# Patient Record
Sex: FEMALE | Race: WHITE | NOT HISPANIC OR LATINO | Employment: FULL TIME | URBAN - METROPOLITAN AREA
[De-identification: names, ages, dates, MRNs, and addresses within clinical notes are randomized per-mention and may not be internally consistent; named-entity substitution may affect disease eponyms.]

---

## 2024-01-17 ENCOUNTER — OFFICE VISIT (OUTPATIENT)
Dept: PRIMARY CARE | Facility: CLINIC | Age: 34
End: 2024-01-17
Payer: COMMERCIAL

## 2024-01-17 VITALS
HEART RATE: 99 BPM | DIASTOLIC BLOOD PRESSURE: 82 MMHG | BODY MASS INDEX: 24.64 KG/M2 | OXYGEN SATURATION: 100 % | WEIGHT: 157 LBS | HEIGHT: 67 IN | TEMPERATURE: 98 F | SYSTOLIC BLOOD PRESSURE: 120 MMHG

## 2024-01-17 DIAGNOSIS — M79.605 LEG PAIN, ANTERIOR, LEFT: Primary | ICD-10-CM

## 2024-01-17 DIAGNOSIS — M76.32 IT BAND SYNDROME, LEFT: ICD-10-CM

## 2024-01-17 PROCEDURE — 99213 OFFICE O/P EST LOW 20 MIN: CPT | Performed by: FAMILY MEDICINE

## 2024-01-17 PROCEDURE — 1036F TOBACCO NON-USER: CPT | Performed by: FAMILY MEDICINE

## 2024-01-17 RX ORDER — ATOMOXETINE 40 MG/1
1 CAPSULE ORAL DAILY
COMMUNITY
Start: 2022-09-06 | End: 2024-01-17 | Stop reason: WASHOUT

## 2024-01-17 RX ORDER — VALACYCLOVIR HYDROCHLORIDE 1 G/1
TABLET, FILM COATED ORAL
COMMUNITY
Start: 2023-02-17

## 2024-01-17 RX ORDER — METHOCARBAMOL 500 MG/1
500 TABLET, FILM COATED ORAL 4 TIMES DAILY PRN
Qty: 90 TABLET | Refills: 0 | Status: SHIPPED | OUTPATIENT
Start: 2024-01-17 | End: 2024-03-11

## 2024-01-17 RX ORDER — NAPROXEN 500 MG/1
500 TABLET ORAL 2 TIMES DAILY PRN
Qty: 60 TABLET | Refills: 0 | Status: SHIPPED | OUTPATIENT
Start: 2024-01-17 | End: 2024-03-18

## 2024-01-17 RX ORDER — LACTIC ACID, L-, CITRIC ACID MONOHYDRATE, AND POTASSIUM BITARTRATE 90; 50; 20 MG/5G; MG/5G; MG/5G
GEL VAGINAL
COMMUNITY
Start: 2023-08-21 | End: 2024-01-17 | Stop reason: WASHOUT

## 2024-01-17 RX ORDER — SUCRALFATE 1 G/1
1 TABLET ORAL 4 TIMES DAILY
COMMUNITY
Start: 2023-03-01

## 2024-01-17 RX ORDER — TRANEXAMIC ACID 650 MG/1
TABLET ORAL
COMMUNITY
Start: 2023-10-17

## 2024-01-17 ASSESSMENT — PATIENT HEALTH QUESTIONNAIRE - PHQ9
2. FEELING DOWN, DEPRESSED OR HOPELESS: NOT AT ALL
SUM OF ALL RESPONSES TO PHQ9 QUESTIONS 1 AND 2: 0
1. LITTLE INTEREST OR PLEASURE IN DOING THINGS: NOT AT ALL

## 2024-01-17 NOTE — PROGRESS NOTES
Patient is here with some left leg pain radiating down into the inside of the left knee.  Nothing that she can really think about.  A little bit worse with sitting to standing.  No bruising or deformity.  She has taken some Tylenol with mild help    REVIEW OF SYSTEMS: 12 systems negative except for those mentioned in the HPI.    PHYSICAL EXAMINATION:   Constitutional: The patient is alert, in no acute  distress.  Eyes: Extraocular movements are intact.   ENT: external ear canals patent  Neck: no  JVD.  Heart: no JVD  Lungs: Normal respiration without stridor or nasal flaring   Psychiatric: Good judgment and insight. Normal affect and mood.  Skin: no rashes or lesions  MSK/neurologic: Cranials 2-12 grossly intact.  5/5 motor lower extremities.  There is pain at the insertion of the medial superior MCL on the left leg going up in the vastus medialis.  There is some spasm midway up the left thigh.  There is also moderate pain to the greater trochanter and bursa on the left with negative BRUNA sign going down the IT band.  No other gross deformity.  Negative psoas sign.      Assessment:  per EMR    Plan:  Discussed physical therapy, naproxen, strengthening with squats and adduction and abduction exercises of the legs.  Also discussed muscle relaxer warm moist heat follow-up if not improving or if physical therapy.  I do believe this is muscular strain    This dictation was created using Dragon dictation and may contain errors

## 2024-01-17 NOTE — PROGRESS NOTES
Leg pain, left - x 1 month, walking makes it worse, starts at groin area and goes down leg medially

## 2024-01-31 DIAGNOSIS — R22.2 SUPRACLAVICULAR FOSSA FULLNESS: Primary | ICD-10-CM

## 2024-08-28 PROBLEM — R00.2 PALPITATIONS: Status: ACTIVE | Noted: 2024-08-28

## 2024-08-28 PROBLEM — D50.9 IRON DEFICIENCY ANEMIA: Status: ACTIVE | Noted: 2024-08-28

## 2024-08-28 PROBLEM — E55.9 VITAMIN D DEFICIENCY: Status: ACTIVE | Noted: 2024-08-28

## 2024-08-28 PROBLEM — F41.9 ANXIETY: Status: ACTIVE | Noted: 2024-08-28

## 2024-08-28 PROBLEM — R51.9 HEADACHE: Status: ACTIVE | Noted: 2024-08-28

## 2024-08-28 PROBLEM — L30.9 ECZEMA: Status: ACTIVE | Noted: 2024-08-28

## 2024-08-28 PROBLEM — R06.02 SHORTNESS OF BREATH: Status: ACTIVE | Noted: 2024-08-28

## 2024-08-28 PROBLEM — A60.00 GENITAL HERPES: Status: ACTIVE | Noted: 2024-08-28

## 2024-08-28 PROBLEM — R41.840 ATTENTION DEFICIT: Status: ACTIVE | Noted: 2024-08-28

## 2024-08-28 PROBLEM — K21.9 GERD (GASTROESOPHAGEAL REFLUX DISEASE): Status: ACTIVE | Noted: 2024-08-28

## 2024-08-28 PROBLEM — B00.9 HERPES SIMPLEX VIRUS (HSV) INFECTION: Status: ACTIVE | Noted: 2024-08-28

## 2024-08-28 PROBLEM — R21 RASH: Status: ACTIVE | Noted: 2024-08-28

## 2024-08-28 PROBLEM — R12 PYROSIS: Status: ACTIVE | Noted: 2024-08-28

## 2024-08-28 PROBLEM — R87.619 ABNORMAL CERVICAL PAPANICOLAOU SMEAR: Status: ACTIVE | Noted: 2024-08-28

## 2024-08-28 PROBLEM — J06.9 URI, ACUTE: Status: ACTIVE | Noted: 2024-08-28

## 2024-08-28 PROBLEM — R07.89 ATYPICAL CHEST PAIN: Status: ACTIVE | Noted: 2024-08-28

## 2024-08-28 PROBLEM — U07.1 DISEASE DUE TO SEVERE ACUTE RESPIRATORY SYNDROME CORONAVIRUS 2 (SARS-COV-2): Status: ACTIVE | Noted: 2024-08-28

## 2024-08-28 PROBLEM — F39 MOOD DISORDER (CMS-HCC): Status: ACTIVE | Noted: 2024-08-28

## 2024-08-28 PROBLEM — F32.A DEPRESSION: Status: ACTIVE | Noted: 2024-08-28

## 2024-08-28 PROBLEM — R19.7 DIARRHEA: Status: ACTIVE | Noted: 2024-08-28

## 2024-08-28 PROBLEM — F41.1 GENERALIZED ANXIETY DISORDER: Status: ACTIVE | Noted: 2024-08-28

## 2024-08-28 PROBLEM — H93.19 TINNITUS: Status: ACTIVE | Noted: 2024-08-28

## 2024-08-28 PROBLEM — R11.2 NAUSEA AND/OR VOMITING: Status: ACTIVE | Noted: 2024-08-28

## 2024-08-28 PROBLEM — R10.9 ABDOMINAL PAIN: Status: ACTIVE | Noted: 2024-08-28

## 2024-08-29 ENCOUNTER — APPOINTMENT (OUTPATIENT)
Dept: PRIMARY CARE | Facility: CLINIC | Age: 34
End: 2024-08-29
Payer: COMMERCIAL

## 2024-08-29 VITALS
HEIGHT: 67 IN | DIASTOLIC BLOOD PRESSURE: 77 MMHG | BODY MASS INDEX: 24.8 KG/M2 | HEART RATE: 78 BPM | SYSTOLIC BLOOD PRESSURE: 114 MMHG | TEMPERATURE: 98 F | WEIGHT: 158 LBS

## 2024-08-29 DIAGNOSIS — F41.9 ANXIETY: Primary | ICD-10-CM

## 2024-08-29 DIAGNOSIS — E55.9 VITAMIN D DEFICIENCY: ICD-10-CM

## 2024-08-29 DIAGNOSIS — M79.605 LEG PAIN, ANTERIOR, LEFT: ICD-10-CM

## 2024-08-29 DIAGNOSIS — R41.840 ATTENTION DEFICIT: ICD-10-CM

## 2024-08-29 DIAGNOSIS — S46.011A STRAIN OF RIGHT ROTATOR CUFF CAPSULE, INITIAL ENCOUNTER: ICD-10-CM

## 2024-08-29 DIAGNOSIS — F41.1 GENERALIZED ANXIETY DISORDER: ICD-10-CM

## 2024-08-29 DIAGNOSIS — M76.32 IT BAND SYNDROME, LEFT: ICD-10-CM

## 2024-08-29 PROCEDURE — 1036F TOBACCO NON-USER: CPT | Performed by: FAMILY MEDICINE

## 2024-08-29 PROCEDURE — 97110 THERAPEUTIC EXERCISES: CPT | Performed by: FAMILY MEDICINE

## 2024-08-29 PROCEDURE — 3008F BODY MASS INDEX DOCD: CPT | Performed by: FAMILY MEDICINE

## 2024-08-29 PROCEDURE — 99214 OFFICE O/P EST MOD 30 MIN: CPT | Performed by: FAMILY MEDICINE

## 2024-08-29 RX ORDER — NAPROXEN 500 MG/1
500 TABLET ORAL 2 TIMES DAILY PRN
Qty: 60 TABLET | Refills: 0 | Status: SHIPPED | OUTPATIENT
Start: 2024-08-29 | End: 2025-04-26

## 2024-08-29 RX ORDER — ATOMOXETINE 40 MG/1
40 CAPSULE ORAL 2 TIMES DAILY
Qty: 60 CAPSULE | Refills: 0 | Status: SHIPPED | OUTPATIENT
Start: 2024-08-29 | End: 2024-09-28

## 2024-08-29 ASSESSMENT — PATIENT HEALTH QUESTIONNAIRE - PHQ9
2. FEELING DOWN, DEPRESSED OR HOPELESS: NOT AT ALL
1. LITTLE INTEREST OR PLEASURE IN DOING THINGS: NOT AT ALL
SUM OF ALL RESPONSES TO PHQ9 QUESTIONS 1 AND 2: 0

## 2024-08-29 NOTE — PROGRESS NOTES
Patient is here for couple of things.  She was talk about possibly ADD.  She never been on medication in the past but does find herself feeling also lack of focus so she does also have some underlying anxiety.  Also she did have right arm pain she is right-handed for about 3 months and then actually does now seem to be kind gone away but she would like it evaluated.    REVIEW OF SYSTEMS: 12 systems negative except for those mentioned in the HPI.    PHYSICAL EXAMINATION:   Constitutional: The patient is alert, in no acute  distress.  Eyes: Extraocular movements are intact.   ENT: external ear canals patent  Neck: no  JVD.  Heart: no JVD  Lungs: Normal respiration without stridor or nasal flaring   Psychiatric: Good judgment and insight. Normal affect and mood.  Skin: no rashes or lesions  MSK/neurologic: Cranial nerves II through XII grossly intact.  There is point tenderness to the supraspinatus insertion of the right arm.  Negative Apley and negative empty can.  5-5 motor upper extremities.  Full range of motion internal/external rotation.      Assessment:  per EMR    Plan:  11 page printout for shoulder rehab is given to the patient and discussed in detail.  Patient will also use naproxen.  Patient does not have formal diagnosis of ADD discussed she would like that and stimulant medication she would need to go to either neurology or psychiatry further new requirements of the  for prescribing.  Will go ahead and do Strattera 40 mg and go up to 80 mg.  Could also consider Hoang Carballo as well but I do not believe that would be covered with care source so we will try Strattera first.  Follow-up in 3 weeks    This dictation was created using Dragon dictation and may contain errors

## 2024-09-23 ENCOUNTER — APPOINTMENT (OUTPATIENT)
Dept: PRIMARY CARE | Facility: CLINIC | Age: 34
End: 2024-09-23
Payer: COMMERCIAL

## 2024-09-23 DIAGNOSIS — R41.840 ATTENTION DEFICIT: Primary | ICD-10-CM

## 2024-09-23 PROCEDURE — 99441 PR PHYS/QHP TELEPHONE EVALUATION 5-10 MIN: CPT | Performed by: FAMILY MEDICINE

## 2024-09-23 NOTE — PROGRESS NOTES
Virtual or Telephone Consent    A telephone visit (audio only) between the patient (at the originating site) and the provider (at the distant site) was utilized to provide this telehealth service.   Verbal consent was requested and obtained from Kimberley Elizabethalycia on this date, 09/23/24 for a telehealth visit.   Patient she started on the Strattera felt like it actually made her little bit more depressed and so she stopped taking it.  She is interested in seeing behavioral health and having full evaluation.    Assessment per EMR    Plan: 6 minutes on the phone coordination of care.  I will put in for behavioral access for evaluation of ADD and further recommendations as well as documentation to state for possible other medications discussed Gelycorbin Carballo which she declines.

## 2025-06-25 ENCOUNTER — APPOINTMENT (OUTPATIENT)
Dept: RADIOLOGY | Facility: HOSPITAL | Age: 35
End: 2025-06-25

## 2025-06-25 ENCOUNTER — HOSPITAL ENCOUNTER (EMERGENCY)
Facility: HOSPITAL | Age: 35
Discharge: HOME | End: 2025-06-25
Attending: EMERGENCY MEDICINE

## 2025-06-25 VITALS
HEIGHT: 65 IN | RESPIRATION RATE: 15 BRPM | OXYGEN SATURATION: 100 % | TEMPERATURE: 98.2 F | HEART RATE: 78 BPM | DIASTOLIC BLOOD PRESSURE: 78 MMHG | SYSTOLIC BLOOD PRESSURE: 125 MMHG | BODY MASS INDEX: 26.33 KG/M2 | WEIGHT: 158 LBS

## 2025-06-25 DIAGNOSIS — M54.31 SCIATICA OF RIGHT SIDE: Primary | ICD-10-CM

## 2025-06-25 LAB
ALBUMIN SERPL BCP-MCNC: 4.2 G/DL (ref 3.4–5)
ALP SERPL-CCNC: 37 U/L (ref 33–110)
ALT SERPL W P-5'-P-CCNC: 16 U/L (ref 7–45)
ANION GAP SERPL CALCULATED.3IONS-SCNC: 11 MMOL/L (ref 10–20)
AST SERPL W P-5'-P-CCNC: 17 U/L (ref 9–39)
B-HCG SERPL-ACNC: <2 MIU/ML
BASOPHILS # BLD AUTO: 0.04 X10*3/UL (ref 0–0.1)
BASOPHILS NFR BLD AUTO: 0.5 %
BILIRUB SERPL-MCNC: 1.2 MG/DL (ref 0–1.2)
BUN SERPL-MCNC: 13 MG/DL (ref 6–23)
CALCIUM SERPL-MCNC: 8.9 MG/DL (ref 8.6–10.3)
CHLORIDE SERPL-SCNC: 106 MMOL/L (ref 98–107)
CO2 SERPL-SCNC: 23 MMOL/L (ref 21–32)
CREAT SERPL-MCNC: 0.67 MG/DL (ref 0.5–1.05)
CRP SERPL-MCNC: <0.1 MG/DL
EGFRCR SERPLBLD CKD-EPI 2021: >90 ML/MIN/1.73M*2
EOSINOPHIL # BLD AUTO: 0.08 X10*3/UL (ref 0–0.7)
EOSINOPHIL NFR BLD AUTO: 1 %
ERYTHROCYTE [DISTWIDTH] IN BLOOD BY AUTOMATED COUNT: 12.3 % (ref 11.5–14.5)
GLUCOSE SERPL-MCNC: 103 MG/DL (ref 74–99)
HCT VFR BLD AUTO: 39.8 % (ref 36–46)
HGB BLD-MCNC: 13.1 G/DL (ref 12–16)
IMM GRANULOCYTES # BLD AUTO: 0.03 X10*3/UL (ref 0–0.7)
IMM GRANULOCYTES NFR BLD AUTO: 0.4 % (ref 0–0.9)
LIPASE SERPL-CCNC: 50 U/L (ref 9–82)
LYMPHOCYTES # BLD AUTO: 0.71 X10*3/UL (ref 1.2–4.8)
LYMPHOCYTES NFR BLD AUTO: 9.1 %
MAGNESIUM SERPL-MCNC: 1.96 MG/DL (ref 1.6–2.4)
MCH RBC QN AUTO: 30.6 PG (ref 26–34)
MCHC RBC AUTO-ENTMCNC: 32.9 G/DL (ref 32–36)
MCV RBC AUTO: 93 FL (ref 80–100)
MONOCYTES # BLD AUTO: 0.24 X10*3/UL (ref 0.1–1)
MONOCYTES NFR BLD AUTO: 3.1 %
NEUTROPHILS # BLD AUTO: 6.67 X10*3/UL (ref 1.2–7.7)
NEUTROPHILS NFR BLD AUTO: 85.9 %
NRBC BLD-RTO: 0 /100 WBCS (ref 0–0)
PHOSPHATE SERPL-MCNC: 2.1 MG/DL (ref 2.5–4.9)
PLATELET # BLD AUTO: 236 X10*3/UL (ref 150–450)
POTASSIUM SERPL-SCNC: 3.8 MMOL/L (ref 3.5–5.3)
PROT SERPL-MCNC: 6.5 G/DL (ref 6.4–8.2)
RBC # BLD AUTO: 4.28 X10*6/UL (ref 4–5.2)
SODIUM SERPL-SCNC: 136 MMOL/L (ref 136–145)
WBC # BLD AUTO: 7.8 X10*3/UL (ref 4.4–11.3)

## 2025-06-25 PROCEDURE — 96361 HYDRATE IV INFUSION ADD-ON: CPT

## 2025-06-25 PROCEDURE — 2500000004 HC RX 250 GENERAL PHARMACY W/ HCPCS (ALT 636 FOR OP/ED)

## 2025-06-25 PROCEDURE — 86140 C-REACTIVE PROTEIN: CPT | Performed by: EMERGENCY MEDICINE

## 2025-06-25 PROCEDURE — 36415 COLL VENOUS BLD VENIPUNCTURE: CPT | Performed by: EMERGENCY MEDICINE

## 2025-06-25 PROCEDURE — 84100 ASSAY OF PHOSPHORUS: CPT | Performed by: EMERGENCY MEDICINE

## 2025-06-25 PROCEDURE — 2500000005 HC RX 250 GENERAL PHARMACY W/O HCPCS

## 2025-06-25 PROCEDURE — 85025 COMPLETE CBC W/AUTO DIFF WBC: CPT | Performed by: EMERGENCY MEDICINE

## 2025-06-25 PROCEDURE — 80053 COMPREHEN METABOLIC PANEL: CPT | Performed by: EMERGENCY MEDICINE

## 2025-06-25 PROCEDURE — 99284 EMERGENCY DEPT VISIT MOD MDM: CPT | Mod: 25 | Performed by: EMERGENCY MEDICINE

## 2025-06-25 PROCEDURE — 96375 TX/PRO/DX INJ NEW DRUG ADDON: CPT

## 2025-06-25 PROCEDURE — 2500000001 HC RX 250 WO HCPCS SELF ADMINISTERED DRUGS (ALT 637 FOR MEDICARE OP)

## 2025-06-25 PROCEDURE — 72131 CT LUMBAR SPINE W/O DYE: CPT | Performed by: RADIOLOGY

## 2025-06-25 PROCEDURE — 74176 CT ABD & PELVIS W/O CONTRAST: CPT

## 2025-06-25 PROCEDURE — 83690 ASSAY OF LIPASE: CPT | Performed by: EMERGENCY MEDICINE

## 2025-06-25 PROCEDURE — 2500000004 HC RX 250 GENERAL PHARMACY W/ HCPCS (ALT 636 FOR OP/ED): Mod: JZ | Performed by: EMERGENCY MEDICINE

## 2025-06-25 PROCEDURE — 74176 CT ABD & PELVIS W/O CONTRAST: CPT | Performed by: RADIOLOGY

## 2025-06-25 PROCEDURE — 84702 CHORIONIC GONADOTROPIN TEST: CPT | Performed by: EMERGENCY MEDICINE

## 2025-06-25 PROCEDURE — 96365 THER/PROPH/DIAG IV INF INIT: CPT

## 2025-06-25 PROCEDURE — 83735 ASSAY OF MAGNESIUM: CPT | Performed by: EMERGENCY MEDICINE

## 2025-06-25 RX ORDER — NAPROXEN 500 MG/1
500 TABLET ORAL
Qty: 30 TABLET | Refills: 0 | Status: SHIPPED | OUTPATIENT
Start: 2025-06-25 | End: 2025-07-10

## 2025-06-25 RX ORDER — KETOROLAC TROMETHAMINE 30 MG/ML
30 INJECTION, SOLUTION INTRAMUSCULAR; INTRAVENOUS ONCE
Status: COMPLETED | OUTPATIENT
Start: 2025-06-25 | End: 2025-06-25

## 2025-06-25 RX ORDER — DIAZEPAM 5 MG/ML
5 INJECTION, SOLUTION INTRAMUSCULAR; INTRAVENOUS ONCE
Status: COMPLETED | OUTPATIENT
Start: 2025-06-25 | End: 2025-06-25

## 2025-06-25 RX ORDER — ACETAMINOPHEN 325 MG/1
975 TABLET ORAL ONCE
Status: COMPLETED | OUTPATIENT
Start: 2025-06-25 | End: 2025-06-25

## 2025-06-25 RX ORDER — METHOCARBAMOL 100 MG/ML
1000 INJECTION, SOLUTION INTRAMUSCULAR; INTRAVENOUS ONCE
Status: COMPLETED | OUTPATIENT
Start: 2025-06-25 | End: 2025-06-25

## 2025-06-25 RX ORDER — CYCLOBENZAPRINE HCL 10 MG
10 TABLET ORAL 2 TIMES DAILY PRN
Qty: 20 TABLET | Refills: 0 | Status: SHIPPED | OUTPATIENT
Start: 2025-06-25 | End: 2025-07-05

## 2025-06-25 RX ORDER — METHYLPREDNISOLONE 4 MG/1
TABLET ORAL
Qty: 21 TABLET | Refills: 0 | Status: SHIPPED | OUTPATIENT
Start: 2025-06-25 | End: 2025-07-01

## 2025-06-25 RX ORDER — LIDOCAINE 560 MG/1
1 PATCH PERCUTANEOUS; TOPICAL; TRANSDERMAL ONCE
Status: DISCONTINUED | OUTPATIENT
Start: 2025-06-25 | End: 2025-06-25 | Stop reason: HOSPADM

## 2025-06-25 RX ADMIN — KETOROLAC TROMETHAMINE 30 MG: 30 INJECTION, SOLUTION INTRAMUSCULAR at 12:18

## 2025-06-25 RX ADMIN — METHOCARBAMOL 1000 MG: 1000 INJECTION, SOLUTION INTRAMUSCULAR; INTRAVENOUS at 12:18

## 2025-06-25 RX ADMIN — ACETAMINOPHEN 975 MG: 325 TABLET ORAL at 12:18

## 2025-06-25 RX ADMIN — SODIUM CHLORIDE 1000 ML: 900 INJECTION, SOLUTION INTRAVENOUS at 14:51

## 2025-06-25 RX ADMIN — PREDNISONE 50 MG: 20 TABLET ORAL at 12:18

## 2025-06-25 RX ADMIN — LIDOCAINE 4% 1 PATCH: 40 PATCH TOPICAL at 12:18

## 2025-06-25 RX ADMIN — DIAZEPAM 5 MG: 5 INJECTION INTRAMUSCULAR; INTRAVENOUS at 13:15

## 2025-06-25 RX ADMIN — KETAMINE HYDROCHLORIDE 7.2 MG: 10 INJECTION INTRAMUSCULAR; INTRAVENOUS at 14:58

## 2025-06-25 ASSESSMENT — PAIN SCALES - GENERAL
PAINLEVEL_OUTOF10: 9

## 2025-06-25 ASSESSMENT — PAIN DESCRIPTION - LOCATION: LOCATION: BACK

## 2025-06-25 ASSESSMENT — LIFESTYLE VARIABLES
EVER FELT BAD OR GUILTY ABOUT YOUR DRINKING: NO
HAVE YOU EVER FELT YOU SHOULD CUT DOWN ON YOUR DRINKING: NO
EVER HAD A DRINK FIRST THING IN THE MORNING TO STEADY YOUR NERVES TO GET RID OF A HANGOVER: NO
HAVE PEOPLE ANNOYED YOU BY CRITICIZING YOUR DRINKING: NO
TOTAL SCORE: 0

## 2025-06-25 ASSESSMENT — PAIN - FUNCTIONAL ASSESSMENT: PAIN_FUNCTIONAL_ASSESSMENT: 0-10

## 2025-06-25 ASSESSMENT — PAIN DESCRIPTION - PAIN TYPE: TYPE: ACUTE PAIN

## 2025-06-25 NOTE — DISCHARGE INSTRUCTIONS
Please take the Medrol Dosepak and rotate Tylenol and naproxen for pain relief.  Use Flexeril which is a muscle relaxer as needed for muscle pain or spasms but do not drive or operate machinery when using Flexeril.  Please follow-up with the orthopedic spine surgeon for further assessment if your symptoms do not improve.  Please refer to the sciatica exercises and discharge instructions attached for further care information.    It is important to remember that your care does not end here and you must continue to monitor your condition closely. Please return to the emergency department for any worsening or concerning signs or symptoms as directed by our conversations and the discharge instructions. If you do not have a doctor please contact the referral number on your discharge instructions. Please contact any physician specialists provided in your discharge notes as it is very important to follow up with them regarding your condition. If you are unable to reach the physicians provided, please come back to the Emergency Department at any time.

## 2025-06-25 NOTE — ED TRIAGE NOTES
Pt was doing yard work when she tweaked her back, pt states she has pins and needles and sharp pain in her lower back and back of right leg got 100mcg fentanyl in squad

## 2025-06-25 NOTE — ED PROVIDER NOTES
HPI   Chief Complaint   Patient presents with    Back Pain       HPI  35-year-old female with history of scoliosis presents for evaluation of low back pain that started yesterday.  Patient states that she was outside doing garden work and she tweaked her low back.  States that the pain is mostly on the right side of her low back radiating down her right leg.  She states after bending over and doing some housework this morning she now is in so much pain that she is having difficulty moving at all.  States it is difficult for her to get up or twist.  She reports that she has never had back pain to this extreme before.  She has not taken anything for pain relief but was provided 100 mcg of fentanyl in the squad.  She otherwise reports no injury or trauma to the low back.  No saddle anesthesia or bowel or bladder incontinence.  No history of IV drug use or malignancy.  No urinary symptoms nausea or vomiting.  She otherwise has no acute complaints.      Patient History   Medical History[1]  Surgical History[2]  Family History[3]  Social History[4]    Physical Exam   ED Triage Vitals   Temperature Heart Rate Respirations BP   06/25/25 1201 06/25/25 1200 06/25/25 1200 06/25/25 1200   36.8 °C (98.2 °F) 70 17 134/85      Pulse Ox Temp Source Heart Rate Source Patient Position   06/25/25 1200 06/25/25 1201 -- --   99 % Oral        BP Location FiO2 (%)     06/25/25 1201 --     Left arm        Physical Exam  Vitals and nursing note reviewed.   Constitutional:       General: She is not in acute distress.     Appearance: She is not toxic-appearing.   Abdominal:      General: Abdomen is flat. There is no distension.      Palpations: Abdomen is soft.      Tenderness: There is no abdominal tenderness. There is no right CVA tenderness or left CVA tenderness.   Musculoskeletal:      Comments: Positive straight leg raise test   Skin:     General: Skin is warm and dry.   Neurological:      Mental Status: She is alert.      Comments:  Normal Sensation in the bilateral inner thighs, lower legs, and feet  5/5 strength bilateral abduction/adduction  5/5 strength bilateral flexion and extension of knee  5/5 strength bilateral dorsiflexion and plantar flexion of ankle   5/5 strength plantarflexion of great toes bilaterally  2+ bilateral knee reflexes              ED Course & MDM   ED Course as of 06/27/25 0808   Wed Jun 25, 2025   1408 Patient was seen and examined in conjunction with advanced practice provider.  Patient has pain on proportion despite multiple pain medications, and muscle relaxers nothing has helped.  Will give dose ketamine, obtain labs for further evaluation and CT imaging. [ML]   1659 Patient now sitting up in exam bed reports that her pain has improved.  Stable for discharge. [JJ]      ED Course User Index  [JJ] Jennifer Marsh PA-C  [ML] Marty R Lejeune,          Diagnoses as of 06/27/25 0808   Sciatica of right side                 No data recorded     [place] Coma Scale Score: 15 (06/25/25 1155 : Vanessa Bear RN)                           Medical Decision Making  Parts of this chart have been completed using voice recognition software. Please excuse any errors of transcription.  My thought process and reason for plan has been formulated from the time that I saw the patient until the time of disposition and is not specific to one specific moment during their visit and furthermore my MDM encompasses this entire chart and not only this text box.      HPI: Detailed above.    Exam: A medically appropriate exam performed, outlined above, given the known history and presentation.    History obtained from: pt    Medications given during visit:  Medications   methocarbamol (Robaxin) injection 1,000 mg (1,000 mg intravenous Given 6/25/25 1218)   ketorolac (Toradol) injection 30 mg (30 mg intravenous Given 6/25/25 1218)   acetaminophen (Tylenol) tablet 975 mg (975 mg oral Given 6/25/25 1218)   predniSONE (Deltasone) tablet 50 mg (50  mg oral Given 6/25/25 1218)   diazePAM (Valium) injection 5 mg (5 mg intravenous Given 6/25/25 1315)   sodium chloride 0.9 % bolus 1,000 mL (0 mL intravenous Stopped 6/25/25 1740)   ketamine (Ketalar) 7.2 mg in sodium chloride 0.9% 100 mL IV (0 mg intravenous Stopped 6/25/25 1620)        Diagnostic/tests  Labs Reviewed   CBC WITH AUTO DIFFERENTIAL - Abnormal       Result Value    WBC 7.8      nRBC 0.0      RBC 4.28      Hemoglobin 13.1      Hematocrit 39.8      MCV 93      MCH 30.6      MCHC 32.9      RDW 12.3      Platelets 236      Neutrophils % 85.9      Immature Granulocytes %, Automated 0.4      Lymphocytes % 9.1      Monocytes % 3.1      Eosinophils % 1.0      Basophils % 0.5      Neutrophils Absolute 6.67      Immature Granulocytes Absolute, Automated 0.03      Lymphocytes Absolute 0.71 (*)     Monocytes Absolute 0.24      Eosinophils Absolute 0.08      Basophils Absolute 0.04     PHOSPHORUS - Abnormal    Phosphorus 2.1 (*)    COMPREHENSIVE METABOLIC PANEL - Abnormal    Glucose 103 (*)     Sodium 136      Potassium 3.8      Chloride 106      Bicarbonate 23      Anion Gap 11      Urea Nitrogen 13      Creatinine 0.67      eGFR >90      Calcium 8.9      Albumin 4.2      Alkaline Phosphatase 37      Total Protein 6.5      AST 17      Bilirubin, Total 1.2      ALT 16     MAGNESIUM - Normal    Magnesium 1.96     C-REACTIVE PROTEIN - Normal    C-Reactive Protein <0.10     LIPASE - Normal    Lipase 50      Narrative:     Venipuncture immediately after or during the administration of Metamizole may lead to falsely low results. Testing should be performed immediately prior to Metamizole dosing.   HUMAN CHORIONIC GONADOTROPIN, SERUM QUANTITATIVE - Normal    HCG, Beta-Quantitative <2      Narrative:      Total HCG measurement is performed using the [employer]   Immunoassay which detects intact HCG and free beta HCG subunit.    This test is not indicated for use as a tumor marker.   HCG testing is performed  using a different test methodology at [facility] than other [facility]. Direct result comparison   should only be made within the same method.          CT abdomen pelvis wo IV contrast   Final Result   1.  There is a right ovarian lesion likely a cyst measuring 2.7 cm.   Otherwise unremarkable             MACRO:   None        Signed by: Joseph Schoenberger 6/25/2025 3:14 PM   Dictation workstation:   [id number]      CT lumbar spine retrospective reconstruction protocol   Final Result   Moderate convex left scoliosis of the lumbar spine. Otherwise   unremarkable CT of the lumbar spine        MACRO:   None        Signed by: Joseph Schoenberger 6/25/2025 3:16 PM   Dictation workstation:   [id number]           Considerations/further MDM:  Patient is a 35-year-old female presenting for evaluation of low back pain radiating down right leg.    I saw this patient in conjunction with Dr. Lejeune.  During the ER visit the patient is awake alert nontoxic-appearing.  She has no midline step-offs or deformities or bony tenderness.  No red flag signs or symptoms to suggest acute spinal emergency epidural abscess, cord compression.  She was provided multimodal pain therapy during the visit.  She did require multiple pain management regimens in order for her pain to improve thus did obtain laboratory workup as well as CT abdomen pelvis and CT lumbar spine for further assessment of her symptoms.  Laboratory workup is unremarkable.  CT abdomen pelvis reveals a right ovarian cyst but otherwise is unremarkable.  CT lumbar spine redemonstrates the patient's scoliosis but otherwise is unremarkable.  She was able to ambulate with nursing staff and reports some improvement in her symptoms.  Her presentation is most consistent with sciatica versus muscle spasms.  Released with Rx for Medrol Dosepak, Flexeril and naproxen as well as a referral to orthopedic spine surgery for further management.  History exam and  workup is not consistent with ureterolithiasis, ovarian torsion, cauda equina syndrome, epidural abscess, acute cystitis.  She is released in good condition.  I discussed the laboratory and imaging findings with the patient at bedside. Patient's questions and concerns were addressed. Patient was released in good condition, discharged with instructions to follow up with primary care provider and appropriate specialist, and to return to ED at any time for worsening symptoms or any other concerns. Patient demonstrates understanding of the findings and the importance of appropriate follow up care.         Procedure  Procedures         [1]   Past Medical History:  Diagnosis Date    Dizziness and giddiness     Lightheadedness    Personal history of other specified conditions     History of dizziness    Personal history of other specified conditions     History of shortness of breath   [2]   Past Surgical History:  Procedure Laterality Date     SECTION, CLASSIC  2016     Section    NECK SURGERY  2016    Neck Surgery    OTHER SURGICAL HISTORY  2022    Complete colonoscopy   [3] No family history on file.  [4]   Social History  Tobacco Use    Smoking status: Former     Types: Cigarettes    Smokeless tobacco: Never   Vaping Use    Vaping status: Never Used   Substance Use Topics    Alcohol use: Yes    Drug use: Never        Jennifer Marsh PA-C  25 0808

## 2025-06-25 NOTE — Clinical Note
Kimberley Smith was seen and treated in our emergency department on 6/25/2025.  She may return to work on 06/28/2025.       If you have any questions or concerns, please don't hesitate to call.      Jennifer Marsh PA-C